# Patient Record
Sex: FEMALE | Race: WHITE
[De-identification: names, ages, dates, MRNs, and addresses within clinical notes are randomized per-mention and may not be internally consistent; named-entity substitution may affect disease eponyms.]

---

## 2020-08-27 ENCOUNTER — HOSPITAL ENCOUNTER (OUTPATIENT)
Dept: HOSPITAL 62 - WI | Age: 65
End: 2020-08-27
Attending: NURSE PRACTITIONER
Payer: MEDICARE

## 2020-08-27 DIAGNOSIS — N64.4: Primary | ICD-10-CM

## 2020-08-27 PROCEDURE — 77066 DX MAMMO INCL CAD BI: CPT

## 2020-08-27 PROCEDURE — 76642 ULTRASOUND BREAST LIMITED: CPT

## 2020-08-27 NOTE — WOMENS IMAGING REPORT
EXAM DESCRIPTION:  BILAT DIAGNOSTIC MAMMO W/CAD; U/S BREAST UNILAT LIMITED



IMAGES COMPLETED DATE/TIME:  8/27/2020 9:59 am; 8/27/2020 10:20 am



REASON FOR STUDY:  N64.4 MASTODYNIA; LT BREAST PAIN, N64.4 N64.4  MASTODYNIA



COMPARISON:  4/3/2019 outside facility.



EXAM PARAMETERS:  Standard craniocaudal and mediolateral oblique views of each breast recorded using 
digital acquisition.

True lateral view left breast.

Read with the assistance of CAD:

[facility]  Version 9.2



LIMITATIONS:  None.



FINDINGS:  RIGHT BREAST

MASSES: No suspicious masses.

CALCIFICATIONS: No new or suspicious calcifications.

ARCHITECTURAL DISTORTION: None.

ASYMMETRY: None noted.

OTHER: No other significant findings.

LEFT BREAST

MASSES: No suspicious masses.

CALCIFICATIONS: No new or suspicious calcifications.

ARCHITECTURAL DISTORTION: None.

ASYMMETRY: None noted.

OTHER: No other significant finding.

Ultrasound of the left breast directed to the inferior breast reduction  scar area.  No solid or cyst
ic mass.



IMPRESSION:  No evidence of malignancy.



BREAST DENSITY:  b. There are scattered areas of fibroglandular density.



BIRAD:  ASSESSMENT:  2 Benign findings.



RECOMMENDATION:  RECOMMENDED FOLLOW UP: Birads 1 or 2: No breast imaging finding to explain the patie
nt's presenting complaint. Further intervention should be based on the degree of clinical suspicion.

SPECIFIC INTERVENTION/IMAGING/CONSULTATION RECOMMENDED:No additional intervention/ imaging/consultati
on needed at this time.

COMMUNICATION:The imaging findings were not discussed with the patient. Her referring provider has be
en notified of the findings.



COMMENT:  The patient has been notified of the results by letter per MQSA requirements. Additional no
tification policies are in place for contacting patient with suspicious or incomplete findings.

Quality ID #225: The American College of [employer] recommends an annual screening mammogram for women
 aged 40 years or over. This facility utilizes a reminder system to ensure that all patients receive 
reminder letters, and/or direct phone calls for appointments. This includes reminders for routine scr
eening mammograms, diagnostic mammograms, or other Breast Imaging Interventions when appropriate.  Th
is patient will be placed in the appropriate reminder system.



TECHNICAL DOCUMENTATION:  FINDING NUMBER: (1)

ASSESSMENT: (1)

JOB ID:  [id number]

 2011 [employer]- All Rights Reserved



Reading location - IP/workstation name: [facility]

## 2020-08-27 NOTE — WOMENS IMAGING REPORT
EXAM DESCRIPTION:  BILAT DIAGNOSTIC MAMMO W/CAD; U/S BREAST UNILAT LIMITED



IMAGES COMPLETED DATE/TIME:  8/27/2020 9:59 am; 8/27/2020 10:20 am



REASON FOR STUDY:  N64.4 MASTODYNIA; LT BREAST PAIN, N64.4 N64.4  MASTODYNIA



COMPARISON:  4/3/2019 outside facility.



EXAM PARAMETERS:  Standard craniocaudal and mediolateral oblique views of each breast recorded using 
digital acquisition.

True lateral view left breast.

Read with the assistance of CAD:

.[facility] - R2  Version 9.2



LIMITATIONS:  None.



FINDINGS:  RIGHT BREAST

MASSES: No suspicious masses.

CALCIFICATIONS: No new or suspicious calcifications.

ARCHITECTURAL DISTORTION: None.

ASYMMETRY: None noted.

OTHER: No other significant findings.

LEFT BREAST

MASSES: No suspicious masses.

CALCIFICATIONS: No new or suspicious calcifications.

ARCHITECTURAL DISTORTION: None.

ASYMMETRY: None noted.

OTHER: No other significant finding.

Ultrasound of the left breast directed to the inferior breast reduction  scar area.  No solid or cyst
ic mass.



IMPRESSION:  No evidence of malignancy.



BREAST DENSITY:  b. There are scattered areas of fibroglandular density.



BIRAD:  ASSESSMENT:  2 Benign findings.



RECOMMENDATION:  RECOMMENDED FOLLOW UP: Birads 1 or 2: No breast imaging finding to explain the patie
nt's presenting complaint. Further intervention should be based on the degree of clinical suspicion.

SPECIFIC INTERVENTION/IMAGING/CONSULTATION RECOMMENDED:No additional intervention/ imaging/consultati
on needed at this time.

COMMUNICATION:The imaging findings were not discussed with the patient. Her referring provider has be
en notified of the findings.



COMMENT:  The patient has been notified of the results by letter per MQSA requirements. Additional no
tification policies are in place for contacting patient with suspicious or incomplete findings.

Quality ID #225: The American College of Radiology recommends an annual screening mammogram for women
 aged 40 years or over. This facility utilizes a reminder system to ensure that all patients receive 
reminder letters, and/or direct phone calls for appointments. This includes reminders for routine scr
eening mammograms, diagnostic mammograms, or other Breast Imaging Interventions when appropriate.  Th
is patient will be placed in the appropriate reminder system.



TECHNICAL DOCUMENTATION:  FINDING NUMBER: (1)

ASSESSMENT: (1)

JOB ID:  [id number]

 2011 [employer]- All Rights Reserved



Reading location - IP/workstation name: [facility]